# Patient Record
Sex: MALE | Race: WHITE | NOT HISPANIC OR LATINO
[De-identification: names, ages, dates, MRNs, and addresses within clinical notes are randomized per-mention and may not be internally consistent; named-entity substitution may affect disease eponyms.]

---

## 2018-01-05 ENCOUNTER — RECORD ABSTRACTING (OUTPATIENT)
Age: 53
End: 2018-01-05

## 2018-01-05 DIAGNOSIS — Z86.59 PERSONAL HISTORY OF OTHER MENTAL AND BEHAVIORAL DISORDERS: ICD-10-CM

## 2018-01-05 DIAGNOSIS — Z60.2 PROBLEMS RELATED TO LIVING ALONE: ICD-10-CM

## 2018-01-05 DIAGNOSIS — Z87.898 PERSONAL HISTORY OF OTHER SPECIFIED CONDITIONS: ICD-10-CM

## 2018-01-05 DIAGNOSIS — F41.9 ANXIETY DISORDER, UNSPECIFIED: ICD-10-CM

## 2018-01-05 DIAGNOSIS — Z84.89 FAMILY HISTORY OF OTHER SPECIFIED CONDITIONS: ICD-10-CM

## 2018-01-05 PROBLEM — Z00.00 ENCOUNTER FOR PREVENTIVE HEALTH EXAMINATION: Status: ACTIVE | Noted: 2018-01-05

## 2018-01-05 SDOH — SOCIAL STABILITY - SOCIAL INSECURITY: PROBLEMS RELATED TO LIVING ALONE: Z60.2

## 2018-03-05 ENCOUNTER — APPOINTMENT (OUTPATIENT)
Dept: CARDIOLOGY | Facility: CLINIC | Age: 53
End: 2018-03-05

## 2018-09-03 ENCOUNTER — EMERGENCY (EMERGENCY)
Facility: HOSPITAL | Age: 53
LOS: 1 days | Discharge: HOME | End: 2018-09-03
Attending: EMERGENCY MEDICINE
Payer: SELF-PAY

## 2018-09-03 VITALS
TEMPERATURE: 99 F | RESPIRATION RATE: 20 BRPM | WEIGHT: 250 LBS | HEIGHT: 66 IN | OXYGEN SATURATION: 99 % | SYSTOLIC BLOOD PRESSURE: 112 MMHG | DIASTOLIC BLOOD PRESSURE: 72 MMHG | HEART RATE: 110 BPM

## 2018-09-03 PROCEDURE — 29125 APPL SHORT ARM SPLINT STATIC: CPT | Mod: LT

## 2018-09-03 PROCEDURE — 99283 EMERGENCY DEPT VISIT LOW MDM: CPT | Mod: 25

## 2018-09-03 PROCEDURE — 99284 EMERGENCY DEPT VISIT MOD MDM: CPT | Mod: 25

## 2018-09-03 PROCEDURE — 73130 X-RAY EXAM OF HAND: CPT

## 2018-09-03 PROCEDURE — 73130 X-RAY EXAM OF HAND: CPT | Mod: 26,LT

## 2018-09-03 PROCEDURE — 29125 APPL SHORT ARM SPLINT STATIC: CPT

## 2018-09-03 RX ORDER — IBUPROFEN 200 MG
600 TABLET ORAL ONCE
Qty: 0 | Refills: 0 | Status: COMPLETED | OUTPATIENT
Start: 2018-09-03 | End: 2018-09-03

## 2018-09-03 RX ADMIN — Medication 600 MILLIGRAM(S): at 23:53

## 2018-09-03 NOTE — ED STATDOCS - MUSCULOSKELETAL, MLM
Minor abrasion on his right knee, full ROM, no swelling. Mild to moderate swelling of left hand, moderate tenderness to MCP of 2nd, 3rd and 4th fingers of left hand.

## 2018-09-03 NOTE — ED STATDOCS - OBJECTIVE STATEMENT
52 y/o M pt with no significant medical hx presents to ED c/o left hand pain, with focal pain of 3rd digit, s/p trip and fall at home today on the stairs. He was holding items in both hands, let go of the content in his left hand while falling and landed on his right knee, left side with majority of contact of left wrist/hand. Pt took 200 mg ibuprofen and iced with frozen chicken PTA. Denies hitting his head, abdominal pain, gait changes, LOC, N/V/D, dizziness, SOB, difficulty breathing or CP.

## 2018-09-03 NOTE — ED ADULT TRIAGE NOTE - CHIEF COMPLAINT QUOTE
I fell walking up the stairs, my sandal got caught and jammed my hand into the stair, landed on right knee, no obvious deformity noted, holding pack of cold chicken on left hand, states he moved middle finger back into place, took ibuprofen PTA

## 2018-09-03 NOTE — ED STATDOCS - ATTENDING CONTRIBUTION TO CARE
I, Alex Wang, performed the initial face to face bedside interview with this patient regarding history of present illness, review of symptoms and relevant past medical, social and family history.  I completed an independent physical examination.  I was the initial provider who evaluated this patient. I have signed out the follow up of any pending tests (i.e. labs, radiological studies) to the ACP.  I have communicated the patient’s plan of care and disposition with the ACP.

## 2018-09-04 RX ORDER — IBUPROFEN 200 MG
1 TABLET ORAL
Qty: 21 | Refills: 0 | OUTPATIENT
Start: 2018-09-04 | End: 2018-09-10

## 2018-09-06 ENCOUNTER — APPOINTMENT (OUTPATIENT)
Dept: CARDIOLOGY | Facility: CLINIC | Age: 53
End: 2018-09-06
Payer: COMMERCIAL

## 2018-09-06 VITALS
BODY MASS INDEX: 40.98 KG/M2 | HEIGHT: 66 IN | SYSTOLIC BLOOD PRESSURE: 126 MMHG | WEIGHT: 255 LBS | DIASTOLIC BLOOD PRESSURE: 80 MMHG | HEART RATE: 111 BPM | RESPIRATION RATE: 14 BRPM

## 2018-09-06 DIAGNOSIS — Z01.810 ENCOUNTER FOR PREPROCEDURAL CARDIOVASCULAR EXAMINATION: ICD-10-CM

## 2018-09-06 PROCEDURE — 93000 ELECTROCARDIOGRAM COMPLETE: CPT

## 2018-09-06 PROCEDURE — 99214 OFFICE O/P EST MOD 30 MIN: CPT

## 2018-09-06 RX ORDER — ALPRAZOLAM 2 MG/1
TABLET ORAL
Refills: 0 | Status: DISCONTINUED | COMMUNITY
End: 2018-09-06

## 2018-12-06 ENCOUNTER — APPOINTMENT (OUTPATIENT)
Dept: CARDIOLOGY | Facility: CLINIC | Age: 53
End: 2018-12-06
Payer: COMMERCIAL

## 2018-12-06 VITALS
WEIGHT: 255 LBS | OXYGEN SATURATION: 95 % | DIASTOLIC BLOOD PRESSURE: 85 MMHG | BODY MASS INDEX: 40.98 KG/M2 | HEIGHT: 66 IN | SYSTOLIC BLOOD PRESSURE: 139 MMHG | RESPIRATION RATE: 14 BRPM | HEART RATE: 102 BPM

## 2018-12-06 PROCEDURE — 99214 OFFICE O/P EST MOD 30 MIN: CPT

## 2018-12-06 PROCEDURE — 93000 ELECTROCARDIOGRAM COMPLETE: CPT

## 2018-12-06 NOTE — PHYSICAL EXAM
[General Appearance - Well Developed] : well developed [General Appearance - Well Nourished] : well nourished [Normal Conjunctiva] : the conjunctiva exhibited no abnormalities [Normal Oropharynx] : normal oropharynx [Normal Jugular Venous V Waves Present] : normal jugular venous V waves present [] : no respiratory distress [Respiration, Rhythm And Depth] : normal respiratory rhythm and effort [Auscultation Breath Sounds / Voice Sounds] : lungs were clear to auscultation bilaterally [Heart Rate And Rhythm] : heart rate and rhythm were normal [Heart Sounds] : normal S1 and S2 [Murmurs] : no murmurs present [Bowel Sounds] : normal bowel sounds [Abdomen Soft] : soft [Abdomen Tenderness] : non-tender [Abnormal Walk] : normal gait [Nail Clubbing] : no clubbing of the fingernails [Cyanosis, Localized] : no localized cyanosis [Skin Color & Pigmentation] : normal skin color and pigmentation [Skin Turgor] : normal skin turgor [Oriented To Time, Place, And Person] : oriented to person, place, and time [Affect] : the affect was normal [FreeTextEntry1] : no edema. Left arm/hand in splint

## 2018-12-06 NOTE — DISCUSSION/SUMMARY
[FreeTextEntry1] : Patient is a 52yo M with HTN, HLD, obesity, family history of CAD, former smoker here for pre-operative evaluation. CArdiac work up within past year showed no evidence of ischemic heart disease and preserved biventricular function. No significant valvular disease either. Mild LVH on echo. Mild resting tachycardia from anxiety and deconditioning. BP above goal. Needs to exercise and significantly improve diet. Has very sedentary lifestyle and is out of shape. \par \par 1. Recommend aggressive diet and lifestyle modifications \par 2. Recommend 30 minutes aerobic activity 4 to 5 days per week as tolerated\par 3.  Increase Benicar-HCT to 40/12.5mg daily, follow up with PMD to recheck\par 4. PMD follow up for ? new diabetes per patient\par 5. Follow up 6 months, consider echo/stress testing then due to increased CV risk and will be 2 years since last evaluation

## 2018-12-06 NOTE — HISTORY OF PRESENT ILLNESS
[FreeTextEntry1] : Patient is a 52yo M with HTN, HLD, obesity, family history of CAD, former smoker here for cardiac follow up. Denies exertional CP. Patient denies PND/orthopnea/edema/palpitations/syncope/claudication. 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 Still no exercise and sedentary. Feels out of shape and tired all the time. Occasionally SOB if "overexerts himself". . \par \par ROS: GI and  negative

## 2018-12-06 NOTE — ASSESSMENT
[FreeTextEntry1] : ECG: ST, NSST\par \par EXERCISE NUCLEAR STRESS TEST 10/2017:\par 1. Normal Sestamibi myocardial perfusion SPECT imaging.\par 2. Left Ventricle is small in size, reducing sensitivity of this study.\par 3. Left ventricular function was normal with an EF of 69%.\par 4. Despite the above described EKG changes, there were no signs of ischemia onperfusion imaging.\par 5. The patient developed no dysrhythmias during exercise.\par 6. The blood pressure response was normal.\par 7. The patient developed shortness of breath and leg fatigue during stress that resolved with rest.\par 8. The test was terminated due to shortness of breath and leg fatigue.\par 9. The patient's functional capacity was average.\par 10. The Duke Treadmill Score was 0, consistent with intermediate risk.\par 11. There are no prior studies on this patient for comparison purposes.\par 12. Recommend clinical correlation with the above findings.\par \par ECHO 10/2017:\par 1. Technically challenging study due to less than ideal echo windows.\par 2. Left ventricular ejection fraction, by visual estimation, is 70 to 75%.\par 3. Hyperdynamic global left ventricular systolic function.\par 4. Study is limited in assessment of regional wall motion abnormalities due to poor endocardial visualization.\par 5. Normal left ventricular internal cavity size.\par 6. Mild concentric left ventricular hypertrophy.\par 7. Normal right ventricular size. Hyperdynamic RV systolic function.\par 8. The left atrium is normal in size and structure.\par 9. The right atrium is normal in size and structure.\par 10. Trace mitral valve regurgitation.\par 11. Normal aortic valve, without any evidence of aortic stenosis or insufficiency.\par 12. There is no evidence of pericardial effusion.\par 13. TR jet was inadequate for evaluation of RV/PA pressure.\par 14. Recommend clinical correlation with the above findings.

## 2019-01-19 ENCOUNTER — RX RENEWAL (OUTPATIENT)
Age: 54
End: 2019-01-19

## 2019-03-18 ENCOUNTER — RX RENEWAL (OUTPATIENT)
Age: 54
End: 2019-03-18

## 2019-06-17 ENCOUNTER — RX RENEWAL (OUTPATIENT)
Age: 54
End: 2019-06-17

## 2019-08-12 ENCOUNTER — APPOINTMENT (OUTPATIENT)
Dept: CARDIOLOGY | Facility: CLINIC | Age: 54
End: 2019-08-12

## 2019-10-02 PROBLEM — Z60.2 PERSON LIVING ALONE: Status: ACTIVE | Noted: 2018-01-05

## 2019-10-10 ENCOUNTER — APPOINTMENT (OUTPATIENT)
Dept: CARDIOLOGY | Facility: CLINIC | Age: 54
End: 2019-10-10

## 2019-10-28 ENCOUNTER — RX RENEWAL (OUTPATIENT)
Age: 54
End: 2019-10-28

## 2020-01-08 ENCOUNTER — RX RENEWAL (OUTPATIENT)
Age: 55
End: 2020-01-08

## 2020-01-29 NOTE — ED STATDOCS - NS ED MD EM SELECTION
41325 Exp Problem Focused - Mod. Complex [Cough] : cough [As Noted in HPI] : as noted in HPI [Wheezing] : wheezing [Negative] : Pulmonary Hypertension [Sputum] : not coughing up ~M sputum [Hemoptysis] : no hemoptysis [Dyspnea] : no dyspnea [Chest Tightness] : no chest tightness [Pleuritic Pain] : no pleuritic pain [Frequent URIs] : no frequent upper respiratory infections [FreeTextEntry8] : Nocturnal wheeze- resolved

## 2020-05-01 ENCOUNTER — RX RENEWAL (OUTPATIENT)
Age: 55
End: 2020-05-01

## 2020-12-14 ENCOUNTER — APPOINTMENT (OUTPATIENT)
Dept: CARDIOLOGY | Facility: CLINIC | Age: 55
End: 2020-12-14
Payer: COMMERCIAL

## 2020-12-14 ENCOUNTER — NON-APPOINTMENT (OUTPATIENT)
Age: 55
End: 2020-12-14

## 2020-12-14 VITALS
DIASTOLIC BLOOD PRESSURE: 74 MMHG | BODY MASS INDEX: 36.96 KG/M2 | HEIGHT: 66 IN | HEART RATE: 113 BPM | RESPIRATION RATE: 15 BRPM | WEIGHT: 230 LBS | TEMPERATURE: 97.2 F | SYSTOLIC BLOOD PRESSURE: 108 MMHG

## 2020-12-14 DIAGNOSIS — N52.9 MALE ERECTILE DYSFUNCTION, UNSPECIFIED: ICD-10-CM

## 2020-12-14 DIAGNOSIS — Z82.49 FAMILY HISTORY OF ISCHEMIC HEART DISEASE AND OTHER DISEASES OF THE CIRCULATORY SYSTEM: ICD-10-CM

## 2020-12-14 PROCEDURE — 99214 OFFICE O/P EST MOD 30 MIN: CPT

## 2020-12-14 PROCEDURE — 93000 ELECTROCARDIOGRAM COMPLETE: CPT

## 2020-12-14 PROCEDURE — 99072 ADDL SUPL MATRL&STAF TM PHE: CPT

## 2020-12-14 NOTE — ASSESSMENT
[FreeTextEntry1] : ECG: ST, NSST, PRWP \par \par EXERCISE NUCLEAR STRESS TEST 10/2017:\par 1. Normal Sestamibi myocardial perfusion SPECT imaging.\par 2. Left Ventricle is small in size, reducing sensitivity of this study.\par 3. Left ventricular function was normal with an EF of 69%.\par 4. Despite the above described EKG changes, there were no signs of ischemia onperfusion imaging.\par 5. The patient developed no dysrhythmias during exercise.\par 6. The blood pressure response was normal.\par 7. The patient developed shortness of breath and leg fatigue during stress that resolved with rest.\par 8. The test was terminated due to shortness of breath and leg fatigue.\par 9. The patient's functional capacity was average.\par 10. The Duke Treadmill Score was 0, consistent with intermediate risk.\par 11. There are no prior studies on this patient for comparison purposes.\par 12. Recommend clinical correlation with the above findings.\par \par ECHO 10/2017:\par 1. Technically challenging study due to less than ideal echo windows.\par 2. Left ventricular ejection fraction, by visual estimation, is 70 to 75%.\par 3. Hyperdynamic global left ventricular systolic function.\par 4. Study is limited in assessment of regional wall motion abnormalities due to poor endocardial visualization.\par 5. Normal left ventricular internal cavity size.\par 6. Mild concentric left ventricular hypertrophy.\par 7. Normal right ventricular size. Hyperdynamic RV systolic function.\par 8. The left atrium is normal in size and structure.\par 9. The right atrium is normal in size and structure.\par 10. Trace mitral valve regurgitation.\par 11. Normal aortic valve, without any evidence of aortic stenosis or insufficiency.\par 12. There is no evidence of pericardial effusion.\par 13. TR jet was inadequate for evaluation of RV/PA pressure.\par 14. Recommend clinical correlation with the above findings.

## 2020-12-14 NOTE — HISTORY OF PRESENT ILLNESS
[FreeTextEntry1] : Patient is a 54yo M with HTN, HLD, obesity, family history of CAD, former smoker here for cardiac follow up. HAd lost a lot of weight, gained some back over past couple years. FEels better with weight loss. Patient denies PND/orthopnea/edema/palpitations/syncope/claudication. Not exercising regularly and mostly sedentary. 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 \par \par ROS: GI and  negative

## 2020-12-14 NOTE — DISCUSSION/SUMMARY
[FreeTextEntry1] : Patient is a 54yo M with HTN, HLD, obesity, family history of CAD, former smoker, erectile dysfunction here for follow up.  ECG with mild abnormalities, ST from white coat syndrome. BP controlled, exam unremarkable. Needs ischemic eval prior to regular exercise. Has had abnormal ECG with exercise in past and needs imaging. \par \par 1. Echo and SEAN prior to exercise in patient with abnormal ECG, HTN, family history, ED and former smoker\par 2. Continue current antihypertensives, blood pressure is well controlled \par 3. Recommend aggressive diet and lifestyle modifications \par 4. Will obtain blood work, can be done at PMD office\par 5. Regular PMD follow up\par 6. Follow up after testing

## 2020-12-28 ENCOUNTER — NON-APPOINTMENT (OUTPATIENT)
Age: 55
End: 2020-12-28

## 2021-01-04 ENCOUNTER — APPOINTMENT (OUTPATIENT)
Dept: CARDIOLOGY | Facility: CLINIC | Age: 56
End: 2021-01-04
Payer: COMMERCIAL

## 2021-01-04 PROCEDURE — 99072 ADDL SUPL MATRL&STAF TM PHE: CPT

## 2021-01-04 PROCEDURE — 93306 TTE W/DOPPLER COMPLETE: CPT

## 2021-01-07 ENCOUNTER — APPOINTMENT (OUTPATIENT)
Dept: CARDIOLOGY | Facility: CLINIC | Age: 56
End: 2021-01-07
Payer: COMMERCIAL

## 2021-01-07 PROCEDURE — 93351 STRESS TTE COMPLETE: CPT

## 2021-01-07 PROCEDURE — 99072 ADDL SUPL MATRL&STAF TM PHE: CPT

## 2021-01-07 RX ORDER — PERFLUTREN 6.52 MG/ML
6.52 INJECTION, SUSPENSION INTRAVENOUS
Qty: 2 | Refills: 0 | Status: COMPLETED | OUTPATIENT
Start: 2021-01-07

## 2021-01-07 RX ADMIN — PERFLUTREN MG/ML: 6.52 INJECTION, SUSPENSION INTRAVENOUS at 00:00

## 2021-01-13 ENCOUNTER — APPOINTMENT (OUTPATIENT)
Dept: CARDIOLOGY | Facility: CLINIC | Age: 56
End: 2021-01-13
Payer: COMMERCIAL

## 2021-01-13 ENCOUNTER — NON-APPOINTMENT (OUTPATIENT)
Age: 56
End: 2021-01-13

## 2021-01-13 VITALS
DIASTOLIC BLOOD PRESSURE: 68 MMHG | RESPIRATION RATE: 15 BRPM | BODY MASS INDEX: 37.45 KG/M2 | HEART RATE: 94 BPM | TEMPERATURE: 97.5 F | HEIGHT: 66 IN | SYSTOLIC BLOOD PRESSURE: 110 MMHG | WEIGHT: 233 LBS

## 2021-01-13 DIAGNOSIS — I10 ESSENTIAL (PRIMARY) HYPERTENSION: ICD-10-CM

## 2021-01-13 DIAGNOSIS — E78.5 HYPERLIPIDEMIA, UNSPECIFIED: ICD-10-CM

## 2021-01-13 DIAGNOSIS — R94.31 ABNORMAL ELECTROCARDIOGRAM [ECG] [EKG]: ICD-10-CM

## 2021-01-13 DIAGNOSIS — Z87.891 PERSONAL HISTORY OF NICOTINE DEPENDENCE: ICD-10-CM

## 2021-01-13 DIAGNOSIS — Z91.89 OTHER SPECIFIED PERSONAL RISK FACTORS, NOT ELSEWHERE CLASSIFIED: ICD-10-CM

## 2021-01-13 DIAGNOSIS — E66.9 OBESITY, UNSPECIFIED: ICD-10-CM

## 2021-01-13 PROCEDURE — 99214 OFFICE O/P EST MOD 30 MIN: CPT

## 2021-01-13 PROCEDURE — 99072 ADDL SUPL MATRL&STAF TM PHE: CPT

## 2021-01-13 NOTE — DISCUSSION/SUMMARY
[FreeTextEntry1] : Patient is a 54yo M with HTN, HLD, obesity, family history of CAD, former smoker, erectile dysfunction here for follow up.  ECG with mild abnormalities, stress test without ischemia but poor functional capacity putting him at increased long term CV risk. Echo shows normal biventricular function and no clinically significant valvular abnormalities. Needs aggressive risk factor modification. \par \par 1. Recommend aggressive diet and lifestyle modifications , goal to get weight down to 220 by follow up\par 2. Recommend 30 minutes moderate intensity aerobic activity 5 days per week \par 3. Continue current antihypertensives, blood pressure is well controlled \par 4. Recommend aggressive diet and lifestyle modifications \par 5. Start Rosuvastatin 10mg qhs due to HLD and increased CV risk \par 5. Follow up 3 months , recheck lipids then

## 2021-01-13 NOTE — ASSESSMENT
[FreeTextEntry1] : ECG: SR, early transition \par \par A1C 5.8 (12/2020)\par  HDL 25   (12/2020) \par \par SEAN 1/2021:\par 1. Negative for ischemia\par 2. Normal LV function\par 3. Achieved 4 minutes, 5 METS. \par 4. DTS = 4\par \par EHO 1/2021:\par 1. Normal LV size, systolic and diastolic function. EF 60-65%\par 2. Normal RV/LA/RA \par 3. No clinically significant valvular disease \par \par \par EXERCISE NUCLEAR STRESS TEST 10/2017:\par 1. Normal Sestamibi myocardial perfusion SPECT imaging.\par 2. Left Ventricle is small in size, reducing sensitivity of this study.\par 3. Left ventricular function was normal with an EF of 69%.\par 4. Despite the above described EKG changes, there were no signs of ischemia onperfusion imaging.\par 5. The patient developed no dysrhythmias during exercise.\par 6. The blood pressure response was normal.\par 7. The patient developed shortness of breath and leg fatigue during stress that resolved with rest.\par 8. The test was terminated due to shortness of breath and leg fatigue.\par 9. The patient's functional capacity was average.\par 10. The Duke Treadmill Score was 0, consistent with intermediate risk.\par 11. There are no prior studies on this patient for comparison purposes.\par 12. Recommend clinical correlation with the above findings.\par \par ECHO 10/2017:\par 1. Technically challenging study due to less than ideal echo windows.\par 2. Left ventricular ejection fraction, by visual estimation, is 70 to 75%.\par 3. Hyperdynamic global left ventricular systolic function.\par 4. Study is limited in assessment of regional wall motion abnormalities due to poor endocardial visualization.\par 5. Normal left ventricular internal cavity size.\par 6. Mild concentric left ventricular hypertrophy.\par 7. Normal right ventricular size. Hyperdynamic RV systolic function.\par 8. The left atrium is normal in size and structure.\par 9. The right atrium is normal in size and structure.\par 10. Trace mitral valve regurgitation.\par 11. Normal aortic valve, without any evidence of aortic stenosis or insufficiency.\par 12. There is no evidence of pericardial effusion.\par 13. TR jet was inadequate for evaluation of RV/PA pressure.\par 14. Recommend clinical correlation with the above findings.

## 2021-01-13 NOTE — HISTORY OF PRESENT ILLNESS
[FreeTextEntry1] : Patient is a 54yo M with HTN, HLD, obesity, family history of CAD, former smoker here for cardiac follow up. HAd lost a lot of weight, gained some back over past couple years. FEels better with weight loss. Patient denies PND/orthopnea/edema/palpitations/syncope/claudication. Not exercising regularly and mostly sedentary. Patient underwent cardiac testing after last visit. 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 \par \par ROS: GI and  negative

## 2021-04-06 ENCOUNTER — RX RENEWAL (OUTPATIENT)
Age: 56
End: 2021-04-06

## 2021-04-06 RX ORDER — SILDENAFIL 50 MG/1
50 TABLET ORAL
Qty: 6 | Refills: 2 | Status: ACTIVE | COMMUNITY
Start: 2020-12-14 | End: 1900-01-01

## 2021-07-06 ENCOUNTER — RX RENEWAL (OUTPATIENT)
Age: 56
End: 2021-07-06

## 2021-10-06 ENCOUNTER — APPOINTMENT (OUTPATIENT)
Dept: CARDIOLOGY | Facility: CLINIC | Age: 56
End: 2021-10-06

## 2021-12-29 ENCOUNTER — RX RENEWAL (OUTPATIENT)
Age: 56
End: 2021-12-29

## 2021-12-30 ENCOUNTER — RX RENEWAL (OUTPATIENT)
Age: 56
End: 2021-12-30

## 2022-07-27 RX ORDER — ROSUVASTATIN CALCIUM 10 MG/1
10 TABLET, FILM COATED ORAL
Qty: 60 | Refills: 0 | Status: ACTIVE | COMMUNITY
Start: 2021-01-13 | End: 1900-01-01

## 2022-07-27 RX ORDER — OLMESARTAN MEDOXOMIL AND HYDROCHLOROTHIAZIDE 40; 12.5 MG/1; MG/1
40-12.5 TABLET ORAL
Qty: 60 | Refills: 0 | Status: ACTIVE | COMMUNITY
Start: 2019-03-18 | End: 1900-01-01

## 2022-09-01 ENCOUNTER — APPOINTMENT (OUTPATIENT)
Dept: CARDIOLOGY | Facility: CLINIC | Age: 57
End: 2022-09-01